# Patient Record
(demographics unavailable — no encounter records)

---

## 2017-03-26 DIAGNOSIS — F90.2 ATTENTION DEFICIT HYPERACTIVITY DISORDER (ADHD), COMBINED TYPE: ICD-10-CM

## 2017-03-26 NOTE — TELEPHONE ENCOUNTER
Methylphenidate      Last Written Prescription Date:  02/14/17  Last Fill Quantity: 30,   # refills: 0  Last Office Visit with [facility] [place], [facility] or  Health prescribing provider: 11/21/16  Future Office visit:       Routing refill request to provider for review/approval because:  Drug not on the [facility] [place], [facility] or  [facility] refill protocol or controlled substance

## 2017-03-27 RX ORDER — METHYLPHENIDATE HYDROCHLORIDE 40 MG/1
40 CAPSULE, EXTENDED RELEASE ORAL DAILY
Qty: 30 CAPSULE | Refills: 0 | Status: SHIPPED | OUTPATIENT
Start: 2017-03-27